# Patient Record
Sex: MALE | Race: ASIAN | NOT HISPANIC OR LATINO | Employment: FULL TIME | URBAN - METROPOLITAN AREA
[De-identification: names, ages, dates, MRNs, and addresses within clinical notes are randomized per-mention and may not be internally consistent; named-entity substitution may affect disease eponyms.]

---

## 2022-02-02 ENCOUNTER — HOSPITAL ENCOUNTER (EMERGENCY)
Facility: HOSPITAL | Age: 27
Discharge: HOME | End: 2022-02-03
Attending: EMERGENCY MEDICINE | Admitting: EMERGENCY MEDICINE

## 2022-02-02 DIAGNOSIS — R07.89 CHEST WALL PAIN: ICD-10-CM

## 2022-02-02 DIAGNOSIS — I10 HYPERTENSION, UNSPECIFIED TYPE: ICD-10-CM

## 2022-02-02 DIAGNOSIS — F41.9 ANXIETY: ICD-10-CM

## 2022-02-02 DIAGNOSIS — R06.02 SOB (SHORTNESS OF BREATH): ICD-10-CM

## 2022-02-02 DIAGNOSIS — R00.0 TACHYCARDIA: ICD-10-CM

## 2022-02-02 LAB
BASOPHILS # BLD AUTO: 0 10E3/UL (ref 0–0.2)
BASOPHILS NFR BLD AUTO: 0 %
EOSINOPHIL # BLD AUTO: 0.1 10E3/UL (ref 0–0.7)
EOSINOPHIL NFR BLD AUTO: 1 %
ERYTHROCYTE [DISTWIDTH] IN BLOOD BY AUTOMATED COUNT: 12.4 % (ref 10–15)
HCT VFR BLD AUTO: 43.6 % (ref 40–53)
HGB BLD-MCNC: 14.7 G/DL (ref 13.3–17.7)
IMM GRANULOCYTES # BLD: 0 10E3/UL
IMM GRANULOCYTES NFR BLD: 0 %
LACTATE SERPL-SCNC: 1.4 MMOL/L (ref 0.7–2)
LYMPHOCYTES # BLD AUTO: 1.5 10E3/UL (ref 0.8–5.3)
LYMPHOCYTES NFR BLD AUTO: 17 %
MCH RBC QN AUTO: 29.7 PG (ref 26.5–33)
MCHC RBC AUTO-ENTMCNC: 33.7 G/DL (ref 31.5–36.5)
MCV RBC AUTO: 88 FL (ref 78–100)
MONOCYTES # BLD AUTO: 0.5 10E3/UL (ref 0–1.3)
MONOCYTES NFR BLD AUTO: 5 %
NEUTROPHILS # BLD AUTO: 6.9 10E3/UL (ref 1.6–8.3)
NEUTROPHILS NFR BLD AUTO: 77 %
NRBC # BLD AUTO: 0 10E3/UL
NRBC BLD AUTO-RTO: 0 /100
PLATELET # BLD AUTO: 316 10E3/UL (ref 150–450)
RBC # BLD AUTO: 4.95 10E6/UL (ref 4.4–5.9)
WBC # BLD AUTO: 9 10E3/UL (ref 4–11)

## 2022-02-02 PROCEDURE — 93005 ELECTROCARDIOGRAM TRACING: CPT | Performed by: EMERGENCY MEDICINE

## 2022-02-02 PROCEDURE — 83880 ASSAY OF NATRIURETIC PEPTIDE: CPT | Performed by: EMERGENCY MEDICINE

## 2022-02-02 PROCEDURE — C9803 HOPD COVID-19 SPEC COLLECT: HCPCS

## 2022-02-02 PROCEDURE — 84484 ASSAY OF TROPONIN QUANT: CPT | Performed by: EMERGENCY MEDICINE

## 2022-02-02 PROCEDURE — 80048 BASIC METABOLIC PNL TOTAL CA: CPT | Performed by: EMERGENCY MEDICINE

## 2022-02-02 PROCEDURE — 87636 SARSCOV2 & INF A&B AMP PRB: CPT | Performed by: EMERGENCY MEDICINE

## 2022-02-02 PROCEDURE — 36415 COLL VENOUS BLD VENIPUNCTURE: CPT | Performed by: EMERGENCY MEDICINE

## 2022-02-02 PROCEDURE — 85025 COMPLETE CBC W/AUTO DIFF WBC: CPT | Performed by: EMERGENCY MEDICINE

## 2022-02-02 PROCEDURE — 96375 TX/PRO/DX INJ NEW DRUG ADDON: CPT

## 2022-02-02 PROCEDURE — 83605 ASSAY OF LACTIC ACID: CPT | Performed by: EMERGENCY MEDICINE

## 2022-02-02 PROCEDURE — 96361 HYDRATE IV INFUSION ADD-ON: CPT

## 2022-02-02 PROCEDURE — 85610 PROTHROMBIN TIME: CPT | Performed by: EMERGENCY MEDICINE

## 2022-02-02 PROCEDURE — 99285 EMERGENCY DEPT VISIT HI MDM: CPT | Mod: 25

## 2022-02-02 PROCEDURE — 96374 THER/PROPH/DIAG INJ IV PUSH: CPT | Mod: 59

## 2022-02-02 PROCEDURE — 258N000003 HC RX IP 258 OP 636: Performed by: EMERGENCY MEDICINE

## 2022-02-02 PROCEDURE — 87040 BLOOD CULTURE FOR BACTERIA: CPT | Performed by: EMERGENCY MEDICINE

## 2022-02-02 RX ORDER — ONDANSETRON 2 MG/ML
4 INJECTION INTRAMUSCULAR; INTRAVENOUS ONCE
Status: COMPLETED | OUTPATIENT
Start: 2022-02-03 | End: 2022-02-03

## 2022-02-02 RX ORDER — LORAZEPAM 2 MG/ML
1 INJECTION INTRAMUSCULAR ONCE
Status: COMPLETED | OUTPATIENT
Start: 2022-02-03 | End: 2022-02-03

## 2022-02-02 RX ADMIN — SODIUM CHLORIDE 500 ML: 9 INJECTION, SOLUTION INTRAVENOUS at 23:51

## 2022-02-02 ASSESSMENT — MIFFLIN-ST. JEOR: SCORE: 2204.16

## 2022-02-02 ASSESSMENT — ENCOUNTER SYMPTOMS
SHORTNESS OF BREATH: 1
FEVER: 0
COUGH: 0
CHILLS: 0

## 2022-02-03 ENCOUNTER — APPOINTMENT (OUTPATIENT)
Dept: CT IMAGING | Facility: HOSPITAL | Age: 27
End: 2022-02-03
Attending: EMERGENCY MEDICINE

## 2022-02-03 VITALS
BODY MASS INDEX: 37.1 KG/M2 | WEIGHT: 265 LBS | TEMPERATURE: 97.6 F | DIASTOLIC BLOOD PRESSURE: 79 MMHG | SYSTOLIC BLOOD PRESSURE: 135 MMHG | OXYGEN SATURATION: 99 % | HEIGHT: 71 IN | HEART RATE: 95 BPM | RESPIRATION RATE: 25 BRPM

## 2022-02-03 VITALS
TEMPERATURE: 99.1 F | SYSTOLIC BLOOD PRESSURE: 167 MMHG | OXYGEN SATURATION: 100 % | WEIGHT: 265 LBS | HEART RATE: 123 BPM | DIASTOLIC BLOOD PRESSURE: 101 MMHG | HEIGHT: 71 IN | RESPIRATION RATE: 11 BRPM | BODY MASS INDEX: 37.1 KG/M2

## 2022-02-03 DIAGNOSIS — F41.1 GENERALIZED ANXIETY DISORDER: ICD-10-CM

## 2022-02-03 DIAGNOSIS — R07.89 CHEST WALL PAIN: ICD-10-CM

## 2022-02-03 LAB
ANION GAP SERPL CALCULATED.3IONS-SCNC: 12 MMOL/L (ref 5–18)
ATRIAL RATE - MUSE: 118 BPM
BNP SERPL-MCNC: <10 PG/ML (ref 0–35)
BUN SERPL-MCNC: 12 MG/DL (ref 8–22)
CALCIUM SERPL-MCNC: 9.4 MG/DL (ref 8.5–10.5)
CHLORIDE BLD-SCNC: 106 MMOL/L (ref 98–107)
CO2 SERPL-SCNC: 21 MMOL/L (ref 22–31)
CREAT SERPL-MCNC: 1 MG/DL (ref 0.7–1.3)
DIASTOLIC BLOOD PRESSURE - MUSE: NORMAL MMHG
FLUAV RNA SPEC QL NAA+PROBE: NEGATIVE
FLUBV RNA RESP QL NAA+PROBE: NEGATIVE
GFR SERPL CREATININE-BSD FRML MDRD: >90 ML/MIN/1.73M2
GLUCOSE BLD-MCNC: 127 MG/DL (ref 70–125)
INR PPP: 1.09 (ref 0.85–1.15)
INTERPRETATION ECG - MUSE: NORMAL
P AXIS - MUSE: 68 DEGREES
POTASSIUM BLD-SCNC: 3.6 MMOL/L (ref 3.5–5)
PR INTERVAL - MUSE: 150 MS
QRS DURATION - MUSE: 90 MS
QT - MUSE: 312 MS
QTC - MUSE: 437 MS
R AXIS - MUSE: 91 DEGREES
SARS-COV-2 RNA RESP QL NAA+PROBE: NEGATIVE
SODIUM SERPL-SCNC: 139 MMOL/L (ref 136–145)
SYSTOLIC BLOOD PRESSURE - MUSE: NORMAL MMHG
T AXIS - MUSE: 22 DEGREES
TROPONIN I SERPL-MCNC: <0.01 NG/ML (ref 0–0.29)
VENTRICULAR RATE- MUSE: 118 BPM

## 2022-02-03 PROCEDURE — 99285 EMERGENCY DEPT VISIT HI MDM: CPT | Mod: 25

## 2022-02-03 PROCEDURE — 250N000011 HC RX IP 250 OP 636: Performed by: EMERGENCY MEDICINE

## 2022-02-03 PROCEDURE — 93005 ELECTROCARDIOGRAM TRACING: CPT | Performed by: EMERGENCY MEDICINE

## 2022-02-03 PROCEDURE — 87040 BLOOD CULTURE FOR BACTERIA: CPT | Performed by: EMERGENCY MEDICINE

## 2022-02-03 PROCEDURE — 96374 THER/PROPH/DIAG INJ IV PUSH: CPT

## 2022-02-03 PROCEDURE — 36415 COLL VENOUS BLD VENIPUNCTURE: CPT | Performed by: EMERGENCY MEDICINE

## 2022-02-03 PROCEDURE — 71275 CT ANGIOGRAPHY CHEST: CPT

## 2022-02-03 RX ORDER — LORAZEPAM 0.5 MG/1
0.5 TABLET ORAL EVERY 8 HOURS PRN
Qty: 9 TABLET | Refills: 0 | Status: SHIPPED | OUTPATIENT
Start: 2022-02-03

## 2022-02-03 RX ORDER — LORAZEPAM 2 MG/ML
1 INJECTION INTRAMUSCULAR ONCE
Status: COMPLETED | OUTPATIENT
Start: 2022-02-03 | End: 2022-02-03

## 2022-02-03 RX ORDER — MORPHINE SULFATE 4 MG/ML
4 INJECTION, SOLUTION INTRAMUSCULAR; INTRAVENOUS ONCE
Status: DISCONTINUED | OUTPATIENT
Start: 2022-02-03 | End: 2022-02-03 | Stop reason: HOSPADM

## 2022-02-03 RX ORDER — IOPAMIDOL 755 MG/ML
100 INJECTION, SOLUTION INTRAVASCULAR ONCE
Status: COMPLETED | OUTPATIENT
Start: 2022-02-03 | End: 2022-02-03

## 2022-02-03 RX ADMIN — LORAZEPAM 1 MG: 2 INJECTION INTRAMUSCULAR; INTRAVENOUS at 00:05

## 2022-02-03 RX ADMIN — IOPAMIDOL 100 ML: 755 INJECTION, SOLUTION INTRAVENOUS at 01:40

## 2022-02-03 RX ADMIN — ONDANSETRON 4 MG: 2 INJECTION INTRAMUSCULAR; INTRAVENOUS at 00:06

## 2022-02-03 RX ADMIN — LORAZEPAM 1 MG: 2 INJECTION INTRAMUSCULAR; INTRAVENOUS at 22:15

## 2022-02-03 ASSESSMENT — MIFFLIN-ST. JEOR: SCORE: 2204.16

## 2022-02-03 ASSESSMENT — ENCOUNTER SYMPTOMS: BACK PAIN: 1

## 2022-02-03 NOTE — DISCHARGE INSTRUCTIONS
As discussed you need to follow-up with your primary care doctor to get your blood pressure rechecked as well as recheck your heart rate as it was noted here to be high as well as blood pressure high fairly consistently.  Otherwise thankfully your EKG, labs, CT scan of your chest did not reveal any concerning cause for your chest discomfort or shortness of breath.  Please return for any new or worsening symptoms.

## 2022-02-03 NOTE — ED TRIAGE NOTES
Pt arrives via EMS for evaluation of SOB and left sided chest pain. Pt states the pain first began around 1pm and then he started feeling SOB around 9pm with continued chest pain. Pt denies any fever or cough. He is tachycardic in the 130's and hypertensive. He appears very anxious. States he has high blood pressure but does not take anything for it.

## 2022-02-03 NOTE — ED PROVIDER NOTES
EMERGENCY DEPARTMENT ENCOUNTER      NAME: Víctor Calderon  AGE: 26 year old male  YOB: 1995  MRN: [id number]  EVALUATION DATE & TIME: 2/2/2022 11:32 PM    PCP: No primary care provider on file.    ED PROVIDER: Maru Middleton M.D.      Chief Complaint   Patient presents with     Shortness of Breath     Chest Pain         FINAL IMPRESSION:  1. Chest wall pain    2. SOB (shortness of breath)    3. Anxiety    4. Tachycardia    5. Hypertension, unspecified type        MEDICAL DECISION MAKING:    Pertinent Labs & Imaging studies reviewed. (See chart for details)  ED Course as of 02/03/22 0217 Wed Feb 02, 2022   2339 Oral temperature is 99.1, hypertensive, tachypneic, tachycardic.  Saturating 100% on room air.  Patient is coming in with shortness of breath, left-sided chest discomfort.  Pinpoint, says it feels like pressure.  Has been feeling more short of breath, especially worsened since 9 PM this evening.  No history of this in the past.  No family history of bleeding dyscrasias.  Fully vaccinated against COVID-19, no recent illness.  No cough or shortness of breath.  No trips or travel.  No swelling in his legs.  No family history of early cardiac disease.Physical exam for patient here does appear to be mildly anxious, tachycardic but with regular rhythm, lungs slightly diminished at left base.  No lower extremity swelling.  Remainder unremarkable.Patient oral temperature him here did come back at 99.1, he does technically meet for SIRS criteria given pulse, respiration rate to some of that may be secondary to anxiety however certainly concern for possible PE versus other intrathoracic etiology.  Will start sepsis work-up but will hold antibiotics for right this minute.  Will check labs, CT scan chest.   6669 Patient significantly anxious here, given a dose of Ativan as well as some Zofran as he is complaining of nausea.  EKG here shows sinus tachycardia rate of 118.  There is right axis  deviation.  No signs of acute ischemia.  No ST elevations or depressions.  Intervals intact.  , QRS 90, QTc is 437.  No previous EKGs for comparison.   Thu Feb 03, 2022   0000 With some anxiety medications patient heart rates come down to the 1 teens.  He does remain hypertensive though has improved from when he was actively panicking.  Blood pressures down to 166/100.  Patient states he does have a history of hypertension, is supposed to be taking medications but does not take it.  I do not see any record for patient in our system, will need this closely follow-up if patient is able to be discharged given his significant hypertension especially at such a young age.       CT scan here without any acute process.  Patient remains here tachycardic, into the one teens.  When I discussed with him results of the CT scan he is very relieved.  Also discussed with him his high heart rate and he states that his heart rates is consistently high.  He also states he is aware that he has blood pressure issues but has not yet been in to see his primary care doctor.  Recently started a new job and recently has insurance and so has some calls out to be seen by primary in the next 1 to 2 weeks.  Told him it is imperative that he gets this done so that he can get on medications to control his blood pressure as well as recheck this heart rate.  Otherwise I do feel there was some component of anxiety with this visit today however he feels significantly improved at this time.  No longer having any chest discomfort.  His only medication he received was Zofran and Ativan.  Will discharge at this time with close follow-up with new primary care.  He is in agreement with plan.    Critical care: 0 minutes excluding separately billable procedures.  Includes bedside management, time reviewing test results, review of records, discussing the case with staff, documenting the medical record and time spent with family members (or surrogate  decision makers) discussing specific treatment issues.          ED COURSE:  11:30 PM I met with the patient to gather history and to perform my initial exam. I discussed the plan for care while in the Emergency Department. PPE (gloves, eye protection, surgical cap, N95 mask, and surgical mask) was worn by me during patient encounters while patient wore mask.   2:12 AM I re-evaluated and updated patient. We discussed plans for discharge and patient is agreeable.         The importance of close follow up was discussed. We reviewed warning signs and symptoms, and I instructed Mr. Calderon to return to the emergency department immediately if he develops any new or worsening symptoms. I provided additional verbal discharge instructions. Mr. Calderon expressed understanding and agreement with this plan of care, his questions were answered, and he was discharged in stable condition.     MEDICATIONS GIVEN IN THE EMERGENCY:  Medications   morphine (PF) injection 4 mg (has no administration in time range)   0.9% sodium chloride BOLUS (0 mLs Intravenous Stopped 2/3/22 0100)   LORazepam (ATIVAN) injection 1 mg (1 mg Intravenous Given 2/3/22 0005)   ondansetron (ZOFRAN) injection 4 mg (4 mg Intravenous Given 2/3/22 0006)   iopamidol (ISOVUE-370) solution 100 mL (100 mLs Intravenous Given 2/3/22 0140)       NEW PRESCRIPTIONS STARTED AT TODAY'S ER VISIT:  New Prescriptions    No medications on file          =================================================================    HPI    Patient information was obtained from: Patient    Use of : N/A         Víctor Calderon is a 26 year old male without a pertinent history who presents via EMS for evaluation of shortness of breath.     Patient reports an onset of shortness of breath since 1300 this afternoon while he was at work that worsened when he got home at 2100. He additionally notes pin point left sided chest pain described as a pressure. Patient states current  symptoms feel like an anxiety attack and endorses shortness of breath currently. He denies a history of similar symptoms. No recent illness. Patient is vaccinated against COVID. Denies a family history of blood clots. He otherwise denies fever, chills, cough, leg swelling, or additional medical concerns or complaints at this time.      REVIEW OF SYSTEMS   Review of Systems   Constitutional: Negative for chills and fever.   Respiratory: Positive for shortness of breath. Negative for cough.    Cardiovascular: Positive for chest pain (left sided; pressure). Negative for leg swelling.   All other systems reviewed and are negative.    PAST MEDICAL HISTORY:  History reviewed. No pertinent past medical history.    PAST SURGICAL HISTORY:  History reviewed. No pertinent surgical history.    CURRENT MEDICATIONS:      Current Facility-Administered Medications:      morphine (PF) injection 4 mg, 4 mg, Intravenous, Once, Enriqueta Middleton MD  No current outpatient medications on file.    ALLERGIES:  No Known Allergies    FAMILY HISTORY:  History reviewed. No pertinent family history.    SOCIAL HISTORY:   Social History     Socioeconomic History     Marital status: Not on file     Spouse name: Not on file     Number of children: Not on file     Years of education: Not on file     Highest education level: Not on file   Occupational History     Not on file   Tobacco Use     Smoking status: Not on file     Smokeless tobacco: Not on file   Substance and Sexual Activity     Alcohol use: Not on file     Drug use: Not on file     Sexual activity: Not on file   Other Topics Concern     Not on file   Social History Narrative     Not on file     Social Determinants of Health     Financial Resource Strain: Not on file   Food Insecurity: Not on file   Transportation Needs: Not on file   Physical Activity: Not on file   Stress: Not on file   Social Connections: Not on file   Intimate Partner Violence: Not on file   Housing Stability: Not on file  "      PHYSICAL EXAM:    Vitals: BP (!) 162/101   Pulse (!) 121   Temp 99.1  F (37.3  C) (Oral)   Resp 9   Ht 1.803 m (5' 11\")   Wt 120.2 kg (265 lb)   SpO2 100%   BMI 36.96 kg/m     General:. Alert and interactive, comfortable appearing.  HENT: Oropharynx without erythema or exudates. MMM.  TMs clear bilaterally.  Eyes: Pupils mid-sized and equally reactive.   Neck: Full AROM.  No midline tenderness to palpation.  Cardiovascular: Tachycardic, regular rhythm. Peripheral pulses 2+ bilaterally.  Chest/Pulmonary: Normal work of breathing. Lungs slightly diminished at left base. No wheezes or crackles. No chest wall tenderness or deformities.  Abdomen: Soft, nondistended. Nontender without guarding or rebound.  Back/Spine: No CVA or midline tenderness.  Extremities: Normal ROM of all major joints. No lower extremity edema.   Skin: Warm and dry. Normal skin color.   Neuro: Speech clear. CNs grossly intact. Moves all extremities appropriately. Strength and sensation grossly intact to all extremities.   Psych: Normal affect/mood, cooperative, memory appropriate. Mildly anxious.     LAB:  All pertinent labs reviewed and interpreted.  Labs Ordered and Resulted from Time of ED Arrival to Time of ED Departure   BASIC METABOLIC PANEL - Abnormal       Result Value    Sodium 139      Potassium 3.6      Chloride 106      Carbon Dioxide (CO2) 21 (*)     Anion Gap 12      Urea Nitrogen 12      Creatinine 1.00      Calcium 9.4      Glucose 127 (*)     GFR Estimate >90     TROPONIN I - Normal    Troponin I <0.01     B-TYPE NATRIURETIC PEPTIDE ([facility] ONLY) - Normal    BNP <10     INR - Normal    INR 1.09     LACTIC ACID WHOLE BLOOD - Normal    Lactic Acid 1.4     INFLUENZA A/B & SARS-COV2 PCR MULTIPLEX - Normal    Influenza A PCR Negative      Influenza B PCR Negative      SARS CoV2 PCR Negative     CBC WITH PLATELETS AND DIFFERENTIAL    WBC Count 9.0      RBC Count 4.95      Hemoglobin 14.7      Hematocrit 43.6      MCV 88   "    MCH 29.7      MCHC 33.7      RDW 12.4      Platelet Count 316      % Neutrophils 77      % Lymphocytes 17      % Monocytes 5      % Eosinophils 1      % Basophils 0      % Immature Granulocytes 0      NRBCs per 100 WBC 0      Absolute Neutrophils 6.9      Absolute Lymphocytes 1.5      Absolute Monocytes 0.5      Absolute Eosinophils 0.1      Absolute Basophils 0.0      Absolute Immature Granulocytes 0.0      Absolute NRBCs 0.0     BLOOD CULTURE   BLOOD CULTURE       RADIOLOGY:  CT Chest Pulmonary Embolism w Contrast   Final Result   IMPRESSION:   1.  There is no pulmonary embolus, aortic aneurysm or dissection. No acute abnormality.   2.  Single small indeterminate right lung nodule.      Recommendations for one or multiple incidental lung nodules < 6mm :     Low risk patients: No routine follow-up.     High risk patients: Optional follow-up CT at 12 months; if unchanged, no further follow-up.      *Low Risk: Minimal or absent history of smoking or other known risk factors.   *Nonsolid (ground glass) or partly solid nodules may require longer follow-up to exclude indolent adenocarcinoma.   *Recommendations based on Guidelines for the Management of Incidental Pulmonary Nodules Detected at CT: From the Fleischner Society 2017, Radiology 2017.             EKG:  See MDM  I have independently reviewed and interpreted the EKG(s) documented above.       I, Kristy Romero, am serving as a scribe to document services personally performed by Dr. Maru Middleton  based on my observation and the provider's statements to me. I, Maru Middleton MD attest that Kristy Romero is acting in a scribe capacity, has observed my performance of the services and has documented them in accordance with my direction.      Maru Middleton M.D.  Emergency Medicine  [facility] EMERGENCY DEPARTMENT  [facility] [facility] [address]  [phone]  Dept: [phone]     Enriqueta Middleton,  MD  02/03/22 0218

## 2022-02-04 NOTE — ED TRIAGE NOTES
"Pt arrive to triage for exact complaints as yesterday. \"I feel like I'm going to have a heart attack\". Pt states \"I got medicine yesterday that helped. It made me relax\". Pt states he needs more ativan.   "

## 2022-02-04 NOTE — DISCHARGE INSTRUCTIONS
Chest wall pain: Ice or heat off-and-on to sore areas can help.  Over-the-counter Tylenol or ibuprofen every 6 hours can help with pain.  Follow-up with your doctor or clinic as needed.  This pain is reproducible and can be recurrent.    Anxiety: 1 Ativan 0.5 mg every 8 hours as needed for anxiety for the next few days.  Do not drive with this.  Again, follow-up with your doctor/clinic.

## 2022-02-04 NOTE — ED PROVIDER NOTES
EMERGENCY DEPARTMENT ENCOUNTER      NAME: Víctor Calderon  AGE: 26 year old male  YOB: 1995  MRN: [id number]  EVALUATION DATE & TIME: 2/3/2022  9:30 PM    PCP: No Ref-Primary, Physician    ED PROVIDER: Will Chambers M.D.      Chief Complaint   Patient presents with     Anxiety         FINAL IMPRESSION:  1.  Chest wall pain.  2.  Generalized anxiety.      ED COURSE & MEDICAL DECISION MAKIN:53 PM I met with the patient to gather history and to perform my initial exam. We discussed plans for the ED course, including diagnostic testing and treatment. PPE worn: cloth mask.    10:47 PM I rechecked the patient who is feeling better.  Patient will be discharged home.  We did talk about ice and heat, Tylenol or ibuprofen for chest wall pain and that it is recurrent and reproducible.  Will place patient on short-term Ativan for anxiety for 2 or 3 days and have him follow-up with his family doctor clinic.  Patient in agreement with this plan and discharged home.      Patient here yesterday with extensive negative work-up including PE run, flu and Covid testing, lactate and INR, troponin and BNP, basic metabolic profile and CBC.  EKG showing only sinus tachycardia.  Patient diagnosed with chest wall pain and generalized anxiety yesterday.  Patient much improved after Ativan.  Patient notes similar symptoms today.  He is not driving.      Pertinent Labs & Imaging studies reviewed. (See chart for details)  26 year old male presents to the Emergency Department for evaluation of chest wall pain and anxiety about this.     At the conclusion of the encounter I discussed the results of all of the tests and the disposition. The questions were answered. The patient or family acknowledged understanding and was agreeable with the care plan.     MEDICATIONS GIVEN IN THE EMERGENCY:  Medications - No data to display    NEW PRESCRIPTIONS STARTED AT TODAY'S ER VISIT  New Prescriptions    No medications on file  "         =================================================================    HPI    Patient information was obtained from: Patient    Use of : N/A         Víctor Calderon is a 26 year old male with a pertinent history of obesity who presents to this ED via private car for evaluation of chest pain.    Per chart review, patient was seen in [facility] Emergency department on 2/2/22 for shortness of breath. CT scan here without any acute process. Patient symptoms consistent with anxiety and felt significantly improved at time of discharge with no more chest discomfort. Only medication he received was Zofran and Ativan. CT and PE negative, flu and covid negative, lactate and INR negative, BNP and troponin negative, BMP abd cbc negative, and EKG resulted in mild sinus tachycardia.  Patient discharged with instruction for close follow-up with new primary care.     Not driving     Patient reports sharp upper left chest and back pain that he says is the \"exact same as yesterday. He notes , \"I feel like I'm going to have a heart attack\" and adds he would like more ativan as this helped him markedly when he was here yesterday. Patient took tylenol at 1300 and benadryl ~2100 today with no relief. He denies driving to the ED today.  Patient with left-sided chest wall pain reproduced with palpation, inspiration, and movement.    He does not identify any waxing or waning symptoms otherwise, exacerbating or alleviating features,associated symptoms except as mentioned. Patient denies any pain related complaints.    REVIEW OF SYSTEMS   Review of Systems   Cardiovascular: Positive for chest pain (sharp , upper left).   Musculoskeletal: Positive for back pain (sharp upper left).   All other systems reviewed and are negative.       PAST MEDICAL HISTORY:  No past medical history on file.    PAST SURGICAL HISTORY:  No past surgical history on file.        CURRENT MEDICATIONS:    No current outpatient medications on " "file.      ALLERGIES:  No Known Allergies    FAMILY HISTORY:  No family history on file.    SOCIAL HISTORY:   Social History     Socioeconomic History     Marital status: Single     Spouse name: Not on file     Number of children: Not on file     Years of education: Not on file     Highest education level: Not on file   Occupational History     Not on file   Tobacco Use     Smoking status: Not on file     Smokeless tobacco: Not on file   Substance and Sexual Activity     Alcohol use: Not on file     Drug use: Not on file     Sexual activity: Not on file   Other Topics Concern     Not on file   Social History Narrative     Not on file     Social Determinants of Health     Financial Resource Strain: Not on file   Food Insecurity: Not on file   Transportation Needs: Not on file   Physical Activity: Not on file   Stress: Not on file   Social Connections: Not on file   Intimate Partner Violence: Not on file   Housing Stability: Not on file       VITALS:  BP (!) 165/100   Pulse 111   Temp 97.6  F (36.4  C) (Temporal)   Resp 18   Ht 1.803 m (5' 11\")   Wt 120.2 kg (265 lb)   SpO2 100%   BMI 36.96 kg/m      PHYSICAL EXAM    Vital Signs:  BP (!) 165/100   Pulse 111   Temp 97.6  F (36.4  C) (Temporal)   Resp 18   Ht 1.803 m (5' 11\")   Wt 120.2 kg (265 lb)   SpO2 100%   BMI 36.96 kg/m    General:  On entering the room  is in no apparent distress.    Neck:  Neck supple with full range of motion and nontender.    Back:  Back and spine are nontender.  No costovertebral angle tenderness.    HEENT:  Oropharynx clear with moist mucous membranes.  HEENT unremarkable.    Pulmonary:  Chest clear to auscultation without rhonchi rales or wheezing.  Pain with palpation left chest, inspiration, and movement.  Cardiovascular:  Cardiac regular rate and rhythm without murmurs rubs or gallops.    Abdomen:  Abdomen soft nontender.  There is no rebound or guarding.    Muskuloskeletal:  he moves all 4 without any difficulty and has " normal neurovascular exams.  Extremities without clubbing, cyanosis, or edema.  Legs and calves are nontender.    Neuro:  he is alert and oriented ×3 and moves all extremities symmetrically.    Psych:  Normal affect.    Skin:  Unremarkable and warm and dry.       LAB:  All pertinent labs reviewed and interpreted.  Labs Ordered and Resulted from Time of ED Arrival to Time of ED Departure - No data to display    RADIOLOGY:  Reviewed all pertinent imaging. Please see official radiology report.  No orders to display              EKG:    EKG unchanged from yesterday.  Normal sinus rhythm and sinus arrhythmia at 96.  Otherwise negative EKG.  I have independently reviewed and interpreted the EKG(s) documented above.    PROCEDURES:   none      I, Pavel Chapa, am serving as a scribe to document services personally performed by Dr. Chambers based on my observation and the provider's statements to me. I, Will Chambers MD attest that Pavel Chapa is acting in a scribe capacity, has observed my performance of the services and has documented them in accordance with my direction.    Will Chambers M.D.  Emergency Medicine  [facility] EMERGENCY DEPARTMENT  [address] [place] [id number]  [phone]  Dept: [phone]       Will Chambers MD  02/03/22 8990

## 2022-02-08 LAB
BACTERIA BLD CULT: NO GROWTH
BACTERIA BLD CULT: NO GROWTH

## 2022-03-10 ENCOUNTER — HOSPITAL ENCOUNTER (EMERGENCY)
Facility: HOSPITAL | Age: 27
Discharge: HOME | End: 2022-03-11
Attending: EMERGENCY MEDICINE | Admitting: EMERGENCY MEDICINE

## 2022-03-10 DIAGNOSIS — R07.9 CHEST PAIN, UNSPECIFIED TYPE: ICD-10-CM

## 2022-03-10 DIAGNOSIS — F12.90 MARIJUANA USE: ICD-10-CM

## 2022-03-10 DIAGNOSIS — F41.1 ANXIETY REACTION: ICD-10-CM

## 2022-03-10 PROCEDURE — 90791 PSYCH DIAGNOSTIC EVALUATION: CPT

## 2022-03-10 PROCEDURE — 99285 EMERGENCY DEPT VISIT HI MDM: CPT | Mod: 25

## 2022-03-10 PROCEDURE — 93005 ELECTROCARDIOGRAM TRACING: CPT | Performed by: EMERGENCY MEDICINE

## 2022-03-10 RX ORDER — OLANZAPINE 5 MG/1
5 TABLET, ORALLY DISINTEGRATING ORAL ONCE
Status: COMPLETED | OUTPATIENT
Start: 2022-03-11 | End: 2022-03-11

## 2022-03-10 ASSESSMENT — ENCOUNTER SYMPTOMS
SHORTNESS OF BREATH: 0
DYSURIA: 0
PALPITATIONS: 1
HALLUCINATIONS: 0
NUMBNESS: 1
DIARRHEA: 0
DIAPHORESIS: 0
FEVER: 0
HEMATURIA: 0
NERVOUS/ANXIOUS: 1
NAUSEA: 1
COUGH: 0
ABDOMINAL PAIN: 0

## 2022-03-10 NOTE — Clinical Note
Víctor Calderon was seen and treated in our emergency department on 3/10/2022.  He may return to work on 03/14/2022.       If you have any questions or concerns, please don't hesitate to call.      Abigail Blood MD

## 2022-03-11 VITALS
BODY MASS INDEX: 36.96 KG/M2 | HEART RATE: 85 BPM | TEMPERATURE: 98.2 F | DIASTOLIC BLOOD PRESSURE: 86 MMHG | SYSTOLIC BLOOD PRESSURE: 137 MMHG | OXYGEN SATURATION: 98 % | RESPIRATION RATE: 12 BRPM | WEIGHT: 265 LBS

## 2022-03-11 LAB
ANION GAP SERPL CALCULATED.3IONS-SCNC: 10 MMOL/L (ref 5–18)
BUN SERPL-MCNC: 13 MG/DL (ref 8–22)
CALCIUM SERPL-MCNC: 9.3 MG/DL (ref 8.5–10.5)
CHLORIDE BLD-SCNC: 106 MMOL/L (ref 98–107)
CO2 SERPL-SCNC: 23 MMOL/L (ref 22–31)
CREAT SERPL-MCNC: 0.92 MG/DL (ref 0.7–1.3)
ERYTHROCYTE [DISTWIDTH] IN BLOOD BY AUTOMATED COUNT: 12.4 % (ref 10–15)
GFR SERPL CREATININE-BSD FRML MDRD: >90 ML/MIN/1.73M2
GLUCOSE BLD-MCNC: 91 MG/DL (ref 70–125)
HCT VFR BLD AUTO: 45.3 % (ref 40–53)
HGB BLD-MCNC: 15 G/DL (ref 13.3–17.7)
MCH RBC QN AUTO: 29.5 PG (ref 26.5–33)
MCHC RBC AUTO-ENTMCNC: 33.1 G/DL (ref 31.5–36.5)
MCV RBC AUTO: 89 FL (ref 78–100)
PLATELET # BLD AUTO: 243 10E3/UL (ref 150–450)
POTASSIUM BLD-SCNC: 3.8 MMOL/L (ref 3.5–5)
RBC # BLD AUTO: 5.09 10E6/UL (ref 4.4–5.9)
SODIUM SERPL-SCNC: 139 MMOL/L (ref 136–145)
TROPONIN I SERPL-MCNC: <0.01 NG/ML (ref 0–0.29)
WBC # BLD AUTO: 9 10E3/UL (ref 4–11)

## 2022-03-11 PROCEDURE — 36415 COLL VENOUS BLD VENIPUNCTURE: CPT | Performed by: EMERGENCY MEDICINE

## 2022-03-11 PROCEDURE — 250N000013 HC RX MED GY IP 250 OP 250 PS 637: Performed by: EMERGENCY MEDICINE

## 2022-03-11 PROCEDURE — 80048 BASIC METABOLIC PNL TOTAL CA: CPT | Performed by: EMERGENCY MEDICINE

## 2022-03-11 PROCEDURE — 85027 COMPLETE CBC AUTOMATED: CPT | Performed by: EMERGENCY MEDICINE

## 2022-03-11 PROCEDURE — 84484 ASSAY OF TROPONIN QUANT: CPT | Performed by: EMERGENCY MEDICINE

## 2022-03-11 RX ORDER — HYDROXYZINE HYDROCHLORIDE 25 MG/1
25 TABLET, FILM COATED ORAL 3 TIMES DAILY PRN
Qty: 21 TABLET | Refills: 0 | Status: SHIPPED | OUTPATIENT
Start: 2022-03-11 | End: 2022-03-25

## 2022-03-11 RX ADMIN — OLANZAPINE 5 MG: 5 TABLET, ORALLY DISINTEGRATING ORAL at 00:53

## 2022-03-11 NOTE — ED NOTES
"No suicidal ideations or homicidal ideations.    Tonight's presentation \"is like the first time all over again and it is just too much I needed to come in and get out of the house to get some help\".    Endorses occasional smoking of marijuana. No changes in lifestyle or stressors endorsed otherwise.  "

## 2022-03-11 NOTE — ED NOTES
"3/10/2022  Víctor Calderon 1995     [facility] Crisis Assessment    Patient was assessed: remote  Patient location: [facility]    Referral Data and Chief Complaint  Víctor Calderon is a 26 year old who uses he/him pronouns. Patient presented to the ED alone and was referred to the ED by self. Patient is presenting to the ED for the following concerns: anxiety and panic attacks.      Informed Consent and Assessment Methods    Patient is his own guardian. Writer met with patient and explained the crisis assessment process, including applicable information disclosures and limits to confidentiality, assessed understanding of the process, and obtained consent to proceed with the assessment. Patient was observed to be able to participate in the assessment as evidenced by voluntarily participated in assessment and aftercare planning. Assessment methods included conducting a formal interview with patient, review of medical records, collaboration with medical staff, and obtaining relevant collateral information from family and community providers when available.    Narrative Summary of Presenting Problem and Current Functioning  What led to the patient presenting for crisis services, factors that make the crisis life threatening or complex, stressors, how is this disrupting the patient's life, and how current functioning is in comparison to baseline. How is patient presenting during the assessment.     From the initial RN triage reporting for this encounter, \"Patient arrives to triage from home with chief complaint of chest pain and anxiety.  Patient reports pain doesn't feel the same like it has in the past when he's experienced chest pain with anxiety.  Reports being seen here in the last month for anxiety attacks.  Had been prescribed Ativan PRN but recently ran out.  Patient is alert and oriented x4.\"     Patient has no SI, no history of SA, HI or SIB.     History of the Crisis  Duration of the current crisis, " coping skills attempted to reduce the crisis, community resources used, and past presentations.    Patient has a history of anxiety.   He has been waiting for his insurance from his employer to become active in order to get care.  He works as a  at a car dealership.  The patient says over the last 3 months, the anxiety has increased and has led to perhaps three panic attacks.    The first one scared him since he had no familiarity with these and thought the issue was heart related.   The patient has never been to therapy.  He had been given a 9 day supply of Ativan and ran out of that tonight.  He does not want to take medication long term.       This patient has done research recently on anxiety and panic attacks.  He has a girlfriend who is well versed he says in mental health who has been supporting him.   He says she has been coaching him and he feels nupur he has this support.  The patient has made a number of lifestyle changes based on what he has learned about anxiety.   While better, he still does not have sufficient tools to manage the anxiety and specifically, to avoid these panic attacks.   He is interested in an in person therapist and medication management     Collateral Information  Medical staff and record    Risk Assessment    Risk of Harm to Self     ESS-6  1.a. Over the past 2 weeks, have you had thoughts of killing yourself? No  1.b. Have you ever attempted to kill yourself and, if yes, when did this last happen? No   2. Recent or current suicide plan? No   3. Recent or current intent to act on ideation? No  4. Lifetime psychiatric hospitalization? No  5. Pattern of excessive substance use?  -- patient uses marijuana   6. Current irritability, agitation, or aggression? No  Scoring note: BOTH 1a and 1b must be yes for it to score 1 point, if both are not yes it is zero. All others are 1 point per number. If all questions 1a/1b - 6 are no, risk is negligible. If one of 1a/1b is yes, then  risk is mild. If either question 2 or 3, but not both, is yes, then risk is automatically moderate regardless of total score. If both 2 and 3 are yes, risk is automatically high regardless of total score.     Score: 1, mild risk -- some question about how much marijuana pt is using but it is not elevated to a clinical level concern at this time    The patient has the following risk factors for suicide: no risk factors identified and other insufficient tools to manage anxiety    Is the patient experiencing current suicidal ideation:  Only passive ideation occasionally about getting off the planet but no attempts or serious SI    Is the patient engaging in preparatory suicide behaviors (formulating how to act on plan, giving away possessions, saying goodbye, displaying dramatic behavior changes, etc)? No    Does the patient have access to firearms or other lethal means? no    The patient has the following protective factors: social support, voluntarily seeking mental health support, future focused thinking, expresses desire to engage in treatment, sense of obligation to people/pets and fulfilling employment    Support system information: girlfriend, some friends and family    Patient strengths: willingness, openness, patient proactive and motivated    Does the patient engage in non-suicidal self-injurious behavior (NSSI/SIB)? no    Is the patient vulnerable to sexual exploitation?  No    Is the patient experiencing abuse or neglect? no    Is the patient a vulnerable adult? No      Risk of Harm to Others  The patient has the following risk factors of harm to others: no risk factors identified    Does the patient have thoughts of harming others? No    Is the patient engaging in sexually inappropriate behavior?  no       Current Substance Abuse    Is there recent substance abuse?  Used some marijuana     Was a urine drug screen or blood alcohol level obtained: No    CAGE AID  Have you felt you ought to cut down on your  drinking or drug use?  No (more of a not really, maybe sometimes)   Have people annoyed you by criticizing your drinking or drug use? No  Have you felt bad or guilty about your drinking or drug use? No  Have you ever had a drink or used drugs first thing in the morning to steady your nerves or to get rid of a hangover? No  Score: 0/4       Current Symptoms/Concerns    Symptoms  Attention, hyperactivity, and impulsivity symptoms present: No    Anxiety symptoms present: Yes: Generalized Symptoms: Cognitive anxiety - feelings of doom, racing thoughts, difficulty concentrating , Excessive worry and Physiological anxiety - sweating, flushing, shaking, shortness of breath, or racing heart      Appetite symptoms present: No     Behavioral difficulties present: No     Cognitive impairment symptoms present: No    Depressive symptoms present: No    Eating disorder symptoms present: No    Learning disabilities, cognitive challenges, and/or developmental disorder symptoms present: No     Manic/hypomanic symptoms present: No    Personality and interpersonal functioning difficulties present : No    Psychosis symptoms present: No      Sleep difficulties present: No    Substance abuse disorder symptoms present: No     Trauma and stressor related symptoms present: No           Mental Status Exam   Affect: Appropriate   Appearance: Appropriate    Attention Span/Concentration: Attentive?    Eye Contact: Engaged   Fund of Knowledge: Appropriate    Language /Speech Content: Fluent   Language /Speech Volume: Normal    Language /Speech Rate/Productions: Normal    Recent Memory: Intact   Remote Memory: Intact   Mood: Anxious and Normal    Orientation to Person: Yes    Orientation to Place: Yes   Orientation to Time of Day: Yes    Orientation to Date: Yes    Situation (Do they understand why they are here?): Yes    Psychomotor Behavior: Normal    Thought Content: Clear   Thought Form: Intact       Mental Health and Substance Abuse  History    History  Current and historical diagnoses or mental health concerns: ANTONIO    Prior MH services (inpatient, programmatic care, outpatient, etc) : No    Has the patient used [facility] crisis team services before?: No    History of substance abuse: No    Prior RASHAD services (inpatient, programmatic care, detox, outpatient, etc) : No    History of commitment: No    Family history of MH/RASHAD:  Unknown    Trauma history: No    Medication  Psychotropic medications: No (short term Ativan ran out)     Current Care Team  Primary Care Provider: No    Psychiatrist: No    Therapist: No    : No    CTSS or ARMHS: No    ACT Team: No    Other: No    Release of Information  Was a release of information signed: No. Reason: No providers       Biopsychosocial Information    Socioeconomic Information  Current living situation: [facility] with girlfriend    Employment/income source: employment    Relevant legal issues: no    Cultural, Jainism, or spiritual influences on mental health care: none reported    Is the patient active in the  or a : No      Relevant Medical Concerns   Patient identifies concerns with completing ADLs? No     Patient can ambulate independently? Yes     Other medical concerns? No     History of concussion or TBI? No        Diagnosis    F41.1  ANTONIO   F41.0  Panic disorder       Therapeutic Intervention  The following therapeutic methodologies were employed when working with the patient: establishing rapport, active listening, assessing dimensions of crisis, identifying additional supports and alternative coping skills and treatment planning. Patient response to intervention: cooperative, motivated.      Disposition  Recommended disposition: Individual Therapy and Medication Management      Reviewed case and recommendations with attending provider. Attending Name: Abigail Blood MD      Attending concurs with disposition: Yes      Patient concurs with disposition: Yes      Guardian  concurs with disposition: NA     Final disposition: Individual therapy  and Medication management.       Clinical Substantiation of Recommendations   Rationale with supporting factors for disposition and diagnosis.     This patient has done research recently on anxiety and panic attacks.  He has a girlfriend who is well versed he says in mental health who has been supporting him.   He says she has been coaching him and he feels nupur he has this support.  The patient has made a number of lifestyle changes based on what he has learned about anxiety.   While better, he still does not have sufficient tools to manage the anxiety and specifically, to avoid these panic attacks.   He is interested in an in person therapist and medication management.      Patient presents with ANTONIO and panic disorder         Assessment Details  Patient interview started at: 12:12 am and completed at: 12:37.    Total duration spent on the patient case in minutes: .75 hrs     CPT code(s) utilized: 15632 - Psychotherapy for Crisis - 60 (30-74*) min       Aftercare and Safety Planning  Follow up plans with MH/RASHAD services: Yes Individual therapy (in person) and med management       Aftercare plan placed in the AVS and provided to patient: Yes. Given to patient by ED Staff    Mansi Moise, [facility]      Aftercare Plan      Thank you for allowing us to provide care tonight.   Staff from [facility] ([facility]) will be calling you.  Their direct number is , likely later today or tomorrow.   They will leave a message if you are busy.   They will be helping you to set up the aftercare services we discussed during your visit -- individual counseling and medication management.      You were seen today for mental health concerns, such as depression, anxiety, or suicidal thinking. Your provider feels that you do not require hospitalization at this time. However, your symptoms may become worse, and you may need to return to the  Emergency Department. Most treatments of depression and suicidal thoughts are a process rather than a single intervention.  Medications and counseling can take several weeks or more to help.     Generally, every Emergency Department visit should have a follow-up clinic visit with either a primary or a specialty clinic/provider. Please follow-up as instructed by your emergency provider today.     By accepting these discharge instructions:    You promise to not harm yourself or others.    You agree that if you feel you are becoming unable to keep that promise, you will do something to help yourself before you do anything to harm yourself or others.     You agree to keep any safety plan arranged on your visit here today.    You agree to take any medication prescribed or recommended by your provider.    If you are getting worse, you can contact a friend or a family member, contact your counselor or family provider, contact a crisis line, or other options discussed with the provider or therapist today.    At any time, you can call 911 and return to the Emergency Department for more help.    You understand that follow-up is essential to your treatment, and you will make and keep appointments recommended on your visit today.     How to improve your mental health and prevent suicide:    Involve others by letting family, friends, counselors know.  Do not isolate yourself.    Avoid alcohol or drugs. Remove weapons, poisons from your home.    Try to stick to routines for eating, sleeping and getting regular exercise.      Try to get into sunlight. Bright natural light not only treats seasonal affective disorder but also depression.    Increase safe activities that you enjoy.     If you feel worse, contact [phone] ([phone]), or call 911, or your primary provider/counselor for additional assistance.    If you were given a prescription for medicine here today, be sure to read all of the information (including the  "package insert) that comes with your prescription.  This will include important information about the medicine, its side effects, and any warnings that you need to know about.  The pharmacist who fills the prescription can provide more information and answer questions you may have about the medicine.  If you have questions or concerns that the pharmacist cannot address, please call or return to the Emergency Department. Remember that you can always come back to the Emergency Department if you are not able to see your regular provider in the amount of time listed above, if you get any new symptoms, or if there is anything that worries you.    Crisis Lines  Crisis Text Line  Text 701751  You will be connected with a trained live crisis counselor to provide support.    Por esteban, geminio  COURT a 984646 o texto a 442-AYUDAME en WhatsArachel    National Hope Line  1.800.SUICIDE [9538839]      Community Resources  Fast Tracker  Linking people to mental health and substance use disorder resources  fasttrackermn.org     [place] Mental Health Warm Line  Peer to peer support  Monday thru Saturday, 12 pm to 10 pm  101.393.9611 or 4.000.046.2107  Text \"Support\" to 06685    National [place] [employer] Mental [employer]ness (KATHIA)  610.164.8333 or 1.888.KATHIA.HELPS        Mental Health Apps  My3  https://myPlanet Labspp.org/    VirtualHopeBox  https://CreaWor.org/apps/virtual-hope-box/          "

## 2022-03-11 NOTE — DISCHARGE INSTRUCTIONS
You were seen in the emergency department for chest pain and anxiety.    I am sending you with a prescription for a different medication you can use for anxiety that is a bit safer than ativan. You should also discuss long-term options with the mental health providers when you meet with them because there may be something you can take daily that would be even better.      Please return to the Emergency Department immediately if you experience chest pain, difficulty breathing, and/or if your symptoms get worse, do not improve, or with any new concerns. Otherwise, please follow up with your primary physician and mental health team for recheck and ongoing management.    Below is some information that you might find informative and useful.     Thank you for choosing [facility]. It was a pleasure taking care of you today!  -Dr. Abigail Blood        Aftercare Plan    Thank you for allowing us to provide care tonight.   Staff from [facility] ([facility]) will be calling you.  Their direct number is , likely later today or tomorrow.   They will leave a message if you are busy.   They will be helping you to set up the aftercare services we discussed during your visit -- individual counseling and medication management.      You were seen today for mental health concerns, such as depression, anxiety, or suicidal thinking. Your provider feels that you do not require hospitalization at this time. However, your symptoms may become worse, and you may need to return to the Emergency Department. Most treatments of depression and suicidal thoughts are a process rather than a single intervention.  Medications and counseling can take several weeks or more to help.     Generally, every Emergency Department visit should have a follow-up clinic visit with either a primary or a specialty clinic/provider. Please follow-up as instructed by your emergency provider today.     By accepting these discharge  instructions:  You promise to not harm yourself or others.  You agree that if you feel you are becoming unable to keep that promise, you will do something to help yourself before you do anything to harm yourself or others.   You agree to keep any safety plan arranged on your visit here today.  You agree to take any medication prescribed or recommended by your provider.  If you are getting worse, you can contact a friend or a family member, contact your counselor or family provider, contact a crisis line, or other options discussed with the provider or therapist today.  At any time, you can call 911 and return to the Emergency Department for more help.  You understand that follow-up is essential to your treatment, and you will make and keep appointments recommended on your visit today.     How to improve your mental health and prevent suicide:  Involve others by letting family, friends, counselors know.  Do not isolate yourself.  Avoid alcohol or drugs. Remove weapons, poisons from your home.  Try to stick to routines for eating, sleeping and getting regular exercise.    Try to get into sunlight. Bright natural light not only treats seasonal affective disorder but also depression.  Increase safe activities that you enjoy.     If you feel worse, contact [phone] ([phone]), or call 911, or your primary provider/counselor for additional assistance.    If you were given a prescription for medicine here today, be sure to read all of the information (including the package insert) that comes with your prescription.  This will include important information about the medicine, its side effects, and any warnings that you need to know about.  The pharmacist who fills the prescription can provide more information and answer questions you may have about the medicine.  If you have questions or concerns that the pharmacist cannot address, please call or return to the Emergency Department. Remember that you can always  "come back to the Emergency Department if you are not able to see your regular provider in the amount of time listed above, if you get any new symptoms, or if there is anything that worries you.    Crisis Lines  Crisis Text Line  Text 269037  You will be connected with a trained live crisis counselor to provide support.    Por espanol, texto  COURT a 896584 o texto a 442-AYUDAME en WhatsApp    National Hope Line  1.800.SUICIDE [0349474]      Community Resources  Fast Tracker  Linking people to mental health and substance use disorder resources  [employer] Artsn.org     [place] Mental Health Warm Line  Peer to peer support  Monday thru Saturday, 12 pm to 10 pm  665.279.6442 or 1.603.057.9933  Text \"Support\" to 60063    National [place] on Mental Illness (KATHIA)  014.643.6907 or 1.888.KATHIA.HELPS    Mental Health Apps  My3  https://my3app.org/    VirtualHopeBox  https://Prestodiag.org/apps/virtual-hope-box/            "

## 2022-03-11 NOTE — ED TRIAGE NOTES
Patient arrives to triage from home with chief complaint of chest pain and anxiety.  Patient reports pain doesn't feel the same like it has in the past when he's experienced chest pain with anxiety.  Reports being seen here in the last month for anxiety attacks.  Had been prescribed Ativan PRN but recently ran out.  Patient is alert and oriented x4.

## 2022-03-11 NOTE — ED NOTES
Pt took ativan over 1 hour ago this was the last dose of this medication. Pt has been seen prior various times for chest pain and dx'd as anxiety due to non cardiac rule out.

## 2022-03-11 NOTE — ED PROVIDER NOTES
"EMERGENCY DEPARTMENT ENCOUNTER      NAME: Víctor Calderon  YOB: 1995  MRN: [id number]      FINAL IMPRESSION  1. Anxiety reaction    2. Chest pain, unspecified type    3. Marijuana use        MEDICAL DECISION MAKING   Pertinent Labs & Imaging studies reviewed. (See chart for details)    Víctor Calderon is a 26 year old male who presents for evaluation of chest pain and anxiety.  Records reviewed.  Patient was seen here on 2/2 and 2/3/2022 for evaluation of the same complaints.  He had a very thorough evaluation including labs, EKG, CT scan and there is no evidence of acute cardiopulmonary process.  Ultimately, it was felt that patient's symptoms are likely related to anxiety.  He was discharged with a prescription for 9 tablets of Ativan to use as needed.  Patient reports that he has had some ongoing episodes of anxiety and took the Ativan he was sent home with but \"saved 1 pill for bad days.\"  This evening while eating dinner, he had recurrence of symptoms and describes discomfort in his left upper chest.  He subsequently started to feel anxious and took his last dose of Ativan about 30 minutes prior to coming in.  He reports improvement in chest discomfort but is now also feeling sleepy.  He cannot identify any clear precipitating stressful events and reports he was feeling well earlier in the day today.  He has no associated suicidal ideation or other mental health concerns.  Patient reports use of marijuana and remotely, cocaine but not for at least 1 year.  He does not use any alcohol or any other drugs.  No other new complaints. Vitals on arrival stable and reassuring. Remainder of history and exam, as below.     I considered a broad differential including but not limited to ACS/ischemia, unstable angina, CHF, pericarditis, myocarditis, pericardial effusion, PE, viral illness, pneumonia, aortic dissection, pneumothorax, costochondritis, pleurisy, GERD, muscular strain/sprain, " anxiety, anemia. No hypoxia, pleuritic pain, tachypnea, or other 2/2 to suggest PE. Patient is PERC negative and I see no indication for repeat workup of PE. HEART score 0 (but patient does have a remote history of cocaine and does use marijuana), assuming normal troponin.  Overall, history and exam does seem most consistent with anxiety/panic attacks but cannot rule out cardiopulmonary process.  Discussed options for work-up and management with patient.  We have agreed on plan to check basic labs, EKG, and trial Zyprexa for anxiety.  Patient was interested in resources for mental health follow-up on an outpatient basis so we will have him speak with social work team as well.      Laboratory work-up unremarkable.  Troponin negative and EKG without evidence of ischemia.  At this point, I have very low suspicion for ACS as etiology of symptoms and do not believe that a second troponin necessary.  Patient met with Mansi of social work team who obtained additional information.  Please see that note for details.  Together, we have agreed that patient would likely not benefit from admission to mental health but would certainly benefit from outpatient follow-up.  We have also agreed that Ativan is not an ideal long-term option for managing anxiety and patient agreed with this as well.  Mansi is able to coordinate follow-up and notes that someone will be calling patient tomorrow to arrange appointments.  In the meantime, will send him with a prescription for hydroxyzine to use as needed and have him discuss other long-term options with the mental health team when he meets with them.    The importance of close follow up was discussed. We reviewed warning signs and symptoms, and I instructed Mr. Calderon to return to the emergency department immediately if he develops any new or worsening symptoms. I provided additional verbal discharge instructions. Mr. Calderon expressed understanding and agreement with this plan  "of care, his questions were answered, and he was discharged in stable condition.         ED COURSE  11:15 PM I met with the patient, obtained history, performed an initial exam, and discussed options and plan for diagnostics and treatment here in the ED.   12:11 AM Spoke with .   12:45 AM I spoke with Mansi of social work again. Someone will call him tomorrow to set up appointments.   12:52 AM I rechecked the patient and discussed discharge.     MEDICATIONS GIVEN IN THE ED  Medications   OLANZapine zydis (zyPREXA) ODT tab 5 mg (5 mg Oral Given 3/11/22 0053)       NEW PRESCRIPTIONS STARTED AT TODAY'S VISIT  New Prescriptions    HYDROXYZINE (ATARAX) 25 MG TABLET    Take 1 tablet (25 mg) by mouth 3 times daily as needed for itching          =================================================================    Chief Complaint   Patient presents with     Chest Pain     Anxiety         HPI:    Patient information was obtained from: patient     Use of : N/A     Víctor Calderon is a 26 year old male who presents for chest pain and anxiety.     Per chart review, on 2/2/2022 and 2/3/2022, patient presented to this ED for evaluation of chest pain and anxiety. On 2/2/2022, patient had an extensive negative work-up including PE run, flu and Covid testing, lactate and INR, troponin and BNP, basic metabolic profile and CBC.  EKG showing only sinus tachycardia. Patient's chest discomfort resolved with Zofran and Ativan. On 2/3/2022, patient presented with similar symptoms and was given Ativan which resolved his symptoms. He was discharged with a prescription of Ativan.     At around 9:45 PM (1.5 hours ago), patient was eating dinner when he started to feel left sided chest pain which feels like a cramping sensation. This came with associated anxiety and near-syncope sensation. Patient describes that he has to focus and \"be in the moment\" when this happens or else he feels like fainting. He also felt " "mildly nauseous, palpitations, and generalized numbness. Endorses that at the time his \"body wanted to shut down\". Patient's girlfriend suggested that he focus on his breathing which he tried to do, however, his palpitations made him more anxious. Around 10:45 PM (30 minutes ago), patient took the last Ativan he was prescribed from 3/3/2022. Currently, patient feels sleepy but still feels his chest pain and anxiety.     Patient's anxiety issues started in 2/2022 but endorses that for the last few days, he's been feeling more anxious. He reports that he stands on his feet all day resulting in leg soreness and he will think that there is something wrong with his legs and feel anxious. Denies any other known triggers for his anxiety. Patient denies having seen a primary care provider, therapist, or other provider for these recent anxiety issues. He was not looking for a provider since he started a new job and did not have insurance until 3/1/2022. Since he has insurance now, patient will start looking in earnest for a primary care provider.     Patient has no medical problems. Denies taking any daily medications. Endorses social history of marijuana and cocaine use but denies alcohol consumption. Patient reports he has not used cocaine in a year.     Patient denies shortness of breath, diaphoresis, fever, cough, abdominal pain, urinary symptoms, diarrhea, leg swelling, thoughts of harming self, hallucinations, or any other complaints at this time.       RELEVANT HISTORY, MEDICATIONS, & ALLERGIES   Past medical history, surgical history, family history, medications, and allergies reviewed and pertinent noted in HPI. See end of note for comprehensive list.    REVIEW OF SYSTEMS:  Review of Systems   Constitutional: Negative for diaphoresis and fever.   Respiratory: Negative for cough and shortness of breath.    Cardiovascular: Positive for chest pain (left-sided, cramping) and palpitations (resolved). Negative for leg " swelling.   Gastrointestinal: Positive for nausea (resolved). Negative for abdominal pain and diarrhea.   Genitourinary: Negative for dysuria and hematuria.   Neurological: Positive for numbness (generalized, resolved).        Positive for feeling like fainting.   Psychiatric/Behavioral: Negative for hallucinations, self-injury and suicidal ideas. The patient is nervous/anxious.         Positive for feeling sleepy.   All other systems reviewed and are negative.      PHYSICAL EXAM:    Vitals: BP (!) 143/89   Pulse 91   Temp 98.2  F (36.8  C) (Oral)   Resp 12   Wt 120.2 kg (265 lb)   SpO2 99%   BMI 36.96 kg/m     General: Alert and interactive, comfortable appearing.  HENT: Oropharynx without erythema or exudates. MMM.   Eyes: Pupils mid-sized and equally reactive.   Neck: Full AROM.   Cardiovascular: Regular rate and rhythm. Peripheral pulses 2+ bilaterally.  Chest/Pulmonary: Normal work of breathing. Lung sounds clear and equal throughout, no wheezes or crackles. No chest wall tenderness or deformities.  Abdomen: Soft, nondistended. Nontender without guarding or rebound.  Back/Spine: No CVA or midline tenderness.  Extremities: Normal ROM of all major joints. No lower extremity edema.   Skin: Warm and dry. Normal skin color.   Neuro: Speech clear. CNs grossly intact. Moves all extremities appropriately. Strength and sensation grossly intact to all extremities.   Psych: Anxious affect/mood, cooperative, memory appropriate. No suicidal ideation, homicidal ideation, or hallucinations.     LAB  Labs Ordered and Resulted from Time of ED Arrival to Time of ED Departure   CBC WITH PLATELETS - Normal       Result Value    WBC Count 9.0      RBC Count 5.09      Hemoglobin 15.0      Hematocrit 45.3      MCV 89      MCH 29.5      MCHC 33.1      RDW 12.4      Platelet Count 243     BASIC METABOLIC PANEL - Normal    Sodium 139      Potassium 3.8      Chloride 106      Carbon Dioxide (CO2) 23      Anion Gap 10      Urea  Nitrogen 13      Creatinine 0.92      Calcium 9.3      Glucose 91      GFR Estimate >90     TROPONIN I - Normal    Troponin I <0.01         EKG  Performed at: 23:05  Impression: Normal sinus rhythm.  No acute ischemic changes.  Normal intervals.  No evidence of WPW, Brugada, HOCM.  No significant change from previous.  Rate: 97 bpm  Rhythm: Sinus  QRS Interval: 90 ms  QTc Interval: 441 ms  Comparison: 2/3/2022    All laboratory and imaging results and EKG's were personally reviewed and interpreted by myself prior to disposition decision.       Comprehensive outline of EPIC chart Hx  PAST MEDICAL HISTORY    History reviewed. No pertinent past medical history.  History reviewed. No pertinent surgical history.    CURRENT MEDICATIONS    Current Outpatient Medications   Medication Instructions     LORazepam (ATIVAN) 0.5 mg, Oral, EVERY 8 HOURS PRN       ALLERGIES    No Known Allergies    FAMILY HISTORY    History reviewed. No pertinent family history.    SOCIAL HISTORY    Social History     Socioeconomic History     Marital status: Single     Spouse name: None     Number of children: None     Years of education: None     Highest education level: None   Occupational History     None   Tobacco Use     Smoking status: None     Smokeless tobacco: None   Substance and Sexual Activity     Alcohol use: None     Drug use: None     Sexual activity: None   Other Topics Concern     None   Social History Narrative     None     Social Determinants of Health     Financial Resource Strain: Not on file   Food Insecurity: Not on file   Transportation Needs: Not on file   Physical Activity: Not on file   Stress: Not on file   Social Connections: Not on file   Intimate Partner Violence: Not on file   Housing Stability: Not on file       ILolita, am serving as a scribe to document services personally performed by Dr. Abigail Blood based on my observation and the provider's statements to me. I, Abigail Blood MD attest that Lolita Garber is  acting in a scribe capacity, has observed my performance of the services and has documented them in accordance with my direction.    Abigail Blood M.D.  Emergency Medicine  [facility] EMERGENCY DEPARTMENT  [address] [place] [id number] [phone]  Dept: [phone]     Abigail Blood MD  03/11/22 0056

## 2022-03-16 LAB
ATRIAL RATE - MUSE: 97 BPM
DIASTOLIC BLOOD PRESSURE - MUSE: NORMAL MMHG
INTERPRETATION ECG - MUSE: NORMAL
P AXIS - MUSE: 57 DEGREES
PR INTERVAL - MUSE: 164 MS
QRS DURATION - MUSE: 90 MS
QT - MUSE: 348 MS
QTC - MUSE: 441 MS
R AXIS - MUSE: 77 DEGREES
SYSTOLIC BLOOD PRESSURE - MUSE: NORMAL MMHG
T AXIS - MUSE: 20 DEGREES
VENTRICULAR RATE- MUSE: 97 BPM

## 2022-04-03 ENCOUNTER — HEALTH MAINTENANCE LETTER (OUTPATIENT)
Age: 27
End: 2022-04-03

## 2022-10-03 ENCOUNTER — HEALTH MAINTENANCE LETTER (OUTPATIENT)
Age: 27
End: 2022-10-03

## 2023-05-20 ENCOUNTER — HEALTH MAINTENANCE LETTER (OUTPATIENT)
Age: 28
End: 2023-05-20

## 2024-07-28 ENCOUNTER — HEALTH MAINTENANCE LETTER (OUTPATIENT)
Age: 29
End: 2024-07-28